# Patient Record
Sex: MALE | Race: WHITE
[De-identification: names, ages, dates, MRNs, and addresses within clinical notes are randomized per-mention and may not be internally consistent; named-entity substitution may affect disease eponyms.]

---

## 2018-11-26 ENCOUNTER — HOSPITAL ENCOUNTER (EMERGENCY)
Dept: HOSPITAL 31 - C.ER | Age: 28
Discharge: HOME | End: 2018-11-26
Payer: SELF-PAY

## 2018-11-26 VITALS — SYSTOLIC BLOOD PRESSURE: 106 MMHG | HEART RATE: 66 BPM | DIASTOLIC BLOOD PRESSURE: 65 MMHG | TEMPERATURE: 98.8 F

## 2018-11-26 VITALS — RESPIRATION RATE: 18 BRPM

## 2018-11-26 DIAGNOSIS — M43.6: Primary | ICD-10-CM

## 2018-11-26 NOTE — C.PDOC
History Of Present Illness





28 years old male presents to ED for complaints of right sided neck pain that 

began yesterday and worsened today morning. Patient describes pain worsens with 

movement. Denies trauma, injury, numbness, tingling, back pain, or any other 

complaints. 


Time Seen by Provider: 11/26/18 07:55


Chief Complaint (Nursing): Back Pain


History Per: Patient


History/Exam Limitations: no limitations


Onset/Duration Of Symptoms: Hrs, Sudden Onset


Current Symptoms Are (Timing): Still Present


Quality Of Discomfort: "Pain"


Previous Symptoms: Neck Pain


Associated Symptoms: None


Exacerbating Factor(s): Movement


Recent travel outside of the [place]: No





Past Medical History


Reviewed: Historical Data, Nursing Documentation, Vital Signs


Vital Signs: 





                                Last Vital Signs











Temp  98.4 F   11/26/18 07:24


 


Pulse  65   11/26/18 07:24


 


Resp  18   11/26/18 07:24


 


BP  145/76   11/26/18 07:24


 


Pulse Ox  100   11/26/18 07:24














- Medical History


PMH: No Chronic Diseases


Surgical History: No Surg Hx


Family History: States: No Known Family Hx





- Social History


Hx Alcohol Use: No


Hx Substance Use: No





- Immunization History


Hx Tetanus Toxoid Vaccination: No


Hx Influenza Vaccination: No


Hx Pneumococcal Vaccination: No





Review Of Systems


Constitutional: Negative for: Fever, Chills


Gastrointestinal: Negative for: Nausea, Vomiting


Musculoskeletal: Positive for: Neck Pain


Skin: Negative for: Rash


Neurological: Negative for: Weakness, Numbness





Physical Exam





- Physical Exam


Appears: Non-toxic, No Acute Distress


Skin: Normal Color, Warm, Dry, No Rash


Head: Atraumatic, Normacephalic


Eye(s): bilateral: Normal Inspection, PERRL, EOMI


Oral Mucosa: Moist


Neck: Normal ROM, No Midline Cervical Tenderness, Paracervical Tenderness (right

sided), No Step Off Deformity, Supple, Other (pain with movement to the ipsila

teral side of pain)


Chest: Symmetrical


Cardiovascular: Rhythm Regular


Respiratory: Normal Breath Sounds, No Rales, No Rhonchi, No Wheezing


Extremity: Normal ROM


Extremity: Bilateral: Atraumatic, Normal Color And Temperature, Normal ROM


Pulses: Left Radial: Normal, Right Radial: Normal


Neurological/Psych: Oriented x3, Normal Speech


Gait: Steady





ED Course And Treatment


O2 Sat by Pulse Oximetry: 100 (RA)


Pulse Ox Interpretation: Normal





Medical Decision Making


Medical Decision Making: 





Plan:


* Flexeril


* Anaprox





Disposition





- Disposition


Referrals: 


[facility] [Outside]


Disposition: HOME/ ROUTINE


Disposition Time: 09:00


Condition: STABLE


Additional Instructions: 


Follow up with the medical doctor/clinic within 1-2 days. Return if worsened. 


Prescriptions: 


Cyclobenzaprine [Flexeril] 5 mg PO TID #21 tab


Naproxen [Naprosyn] 500 mg PO BID #20 tab


Instructions:  Torticollis (DC)


Forms:  [employer] (English), Work Excuse





- Clinical Impression


Clinical Impression: 


 Torticollis








- PA / NP / Resident Statement


MD/DO has reviewed & agrees with the documentation as recorded.





- Scribe Statement


The provider has reviewed the documentation as recorded by the Lurdesibangeline Best





All medical record entries made by the Leighton were at my direction and 

personally dictated by me. I have reviewed the chart and agree that the record 

accurately reflects my personal performance of the history, physical exam, 

medical decision making, and the department course for this patient. I have also

 personally directed, reviewed, and agree with the discharge instructions and 

disposition.

## 2018-11-27 VITALS — OXYGEN SATURATION: 100 %

## 2018-12-01 ENCOUNTER — HOSPITAL ENCOUNTER (EMERGENCY)
Dept: HOSPITAL 31 - C.ER | Age: 28
Discharge: HOME | End: 2018-12-01
Payer: SELF-PAY

## 2018-12-01 VITALS
TEMPERATURE: 97.6 F | SYSTOLIC BLOOD PRESSURE: 117 MMHG | DIASTOLIC BLOOD PRESSURE: 69 MMHG | RESPIRATION RATE: 20 BRPM | OXYGEN SATURATION: 99 % | HEART RATE: 93 BPM

## 2018-12-01 VITALS — BODY MASS INDEX: 22.4 KG/M2

## 2018-12-01 DIAGNOSIS — M79.18: Primary | ICD-10-CM

## 2018-12-01 NOTE — C.PDOC
History Of Present Illness


27 y/o male presents to the ED complaining of sharp, left-sided rib pain for the

last 3 days. Denies recent trauma. Pain worsens with movement of the right arm 

or rotation of his body. He denies any SOB, palpitations, dizziness, fever, 

chills, cough, or recent travel. Denies any known PMHx. Denies family history of

sudden cardiac death at a young age.





Time Seen by Provider: 18 12:13


Chief Complaint (Nursing): Chest Pain


History Per: Patient


History/Exam Limitations: no limitations


Onset/Duration Of Symptoms: Days (x3)


Current Symptoms Are (Timing): Still Present





Past Medical History


Reviewed: Historical Data, Nursing Documentation, Vital Signs


Vital Signs: 





                                Last Vital Signs











Temp  97.6 F   18 11:49


 


Pulse  93 H  18 11:49


 


Resp  20   18 11:49


 


BP  117/69   18 11:49


 


Pulse Ox  99   18 11:49














- Medical History


PMH: No Chronic Diseases


Surgical History: No Surg Hx


Family History: States: No Known Family Hx





- Social History


Hx Tobacco Use: No


Hx Alcohol Use: No


Hx Substance Use: No





- Immunization History


Hx Tetanus Toxoid Vaccination: No


Hx Influenza Vaccination: No


Hx Pneumococcal Vaccination: No





Review Of Systems


Except As Marked, All Systems Reviewed And Found Negative.


Constitutional: Negative for: Fever, Chills


Cardiovascular: Positive for: Chest Pain (right rib pain).  Negative for: 

Palpitations, Light Headedness


Respiratory: Negative for: Cough, Shortness of Breath


Gastrointestinal: Negative for: Nausea, Vomiting


Neurological: Negative for: Weakness, Numbness, Dizziness





Physical Exam





- Physical Exam


Appears: Well, Non-toxic, No Acute Distress


Skin: Warm, Dry, No Diaphoretic


Head: Atraumatic, Normacephalic


Eye(s): bilateral: Normal Inspection, PERRL, EOMI


Neck: Normal ROM


Chest: Tenderness (Point tenderness to left anterior rib cage, over ribs 5-7)


Cardiovascular: Rhythm Regular, No Murmur


Respiratory: Normal Breath Sounds, No Rales, No Rhonchi, No Wheezing


Gastrointestinal/Abdominal: Soft, No Tenderness, No Distention


Extremity: Bilateral: Normal Color And Temperature, Normal ROM


Pulses: Left Dorsalis Pedis: Normal, Right Dorsalis Pedis: Normal


Neurological/Psych: Oriented x3, Normal Speech


Gait: Steady





ED Course And Treatment


ECG: Interpreted By Me, Viewed By Me


ECG Rhythm: Sinus Rhythm


Interpretation Of ECG: normal axis, normal intervals


Rate From EC (bpm)


O2 Sat by Pulse Oximetry: 99 (RA)


Pulse Ox Interpretation: Normal





Medical Decision Making


Medical Decision Making: 





Impression: Right-sided rib pain





Initial Plan:


--Chest x-ray


--EKG


--Motrin 600 mg PO





CXR shows no acute disease.


Patient remains AAOx3, afebrile, and is resting comfortably on reevaluation. 

Plan is to discharge home with muscle relaxant and ibuprofen. 


Counseled patient regarding diagnosis and follow-up instructions.








Disposition


Counseled Patient/Family Regarding: Studies Performed, Diagnosis, Need For 

Followup, Rx Given





- Disposition


Referrals: 


[facility], [Non-Staff] - 


Disposition: HOME/ ROUTINE


Disposition Time: 13:10


Condition: GOOD


Additional Instructions: 





FEDERICO COOPER, thank you for letting us take care of you today. The emergency 

medical care you received today was directed at your acute symptoms. If you were

prescribed any medication, please fill it and take as directed. It may take 

several days for your symptoms to resolve. Return to the Emergency Department if

your symptoms worsen, do not improve, or if you have any other problems.





Please contact your doctor or call one of the physicians/clinics you have been 

referred to that are listed on the Patient Visit Information form that is 

included in your discharge packet. Bring any paperwork you were given at 

discharge with you along with any medications you are taking to your follow up 

visit. Our treatment cannot replace ongoing medical care by a primary care 

provider outside of the emergency department.





Thank you for allowing the [facility] team to be part of your care today.














Follow up with your primary care doctor in 2-3 days for re-evaluation and 

further management.


Prescriptions: 


Cyclobenzaprine [Cyclobenzaprine HCl] 10 mg PO Q8 PRN #20 tab


 PRN Reason: Muscle Spasm


Ibuprofen [Motrin] 600 mg PO Q6 PRN #20 tab


 PRN Reason: Pain, Moderate (4-7)


Instructions:  Muscle and Bone Pain (DC)


Forms:  [facility] (English)





- POA


Present On Arrival: None





- Clinical Impression


Clinical Impression: 


 Musculoskeletal pain








- Scribe Statement


The provider has reviewed the documentation as recorded by the Leighton Simons





Provider Attestation: 


All medical record entries made by the Leighton were at my direction and 

personally dictated by me. I have reviewed the chart and agree that the record 

accurately reflects my personal performance of the history, physical exam, 

medical decision making, and the department course for this patient. I have also

 personally directed, reviewed, and agree with the discharge instructions and 

disposition.

## 2018-12-01 NOTE — RAD
Chest x-ray two views 



HISTORY:

Infiltrate. 



Comparison: None available. 



Findings: 



Mild venous congestion. 



Heart size within normal limits. 



Impression: 



Mild venous congestion.

## 2018-12-04 NOTE — CARD
--------------- APPROVED REPORT --------------





Date of service: 12/01/2018



EKG Measurement

Heart [id number]

[id number]

[id number]

[id number]

[id number]



<Conclusion>

Normal sinus rhythm

Normal ECG